# Patient Record
Sex: MALE | Race: WHITE | NOT HISPANIC OR LATINO | Employment: FULL TIME | URBAN - METROPOLITAN AREA
[De-identification: names, ages, dates, MRNs, and addresses within clinical notes are randomized per-mention and may not be internally consistent; named-entity substitution may affect disease eponyms.]

---

## 2024-01-09 VITALS
HEIGHT: 70 IN | BODY MASS INDEX: 36.39 KG/M2 | WEIGHT: 254.19 LBS | TEMPERATURE: 98.6 F | SYSTOLIC BLOOD PRESSURE: 129 MMHG | DIASTOLIC BLOOD PRESSURE: 90 MMHG | OXYGEN SATURATION: 98 % | HEART RATE: 74 BPM | RESPIRATION RATE: 16 BRPM

## 2024-01-09 PROCEDURE — 99283 EMERGENCY DEPT VISIT LOW MDM: CPT

## 2024-01-10 ENCOUNTER — HOSPITAL ENCOUNTER (EMERGENCY)
Facility: HOSPITAL | Age: 49
Discharge: HOME | End: 2024-01-10
Attending: EMERGENCY MEDICINE | Admitting: EMERGENCY MEDICINE
Payer: MEDICARE

## 2024-01-10 DIAGNOSIS — T14.8XXA ANIMAL BITE: Primary | ICD-10-CM

## 2024-01-10 RX ORDER — AMOXICILLIN AND CLAVULANATE POTASSIUM 875; 125 MG/1; MG/1
1 TABLET, FILM COATED ORAL 2 TIMES DAILY
Qty: 14 TABLET | Refills: 0 | Status: SHIPPED | OUTPATIENT
Start: 2024-01-10 | End: 2024-01-17

## 2024-01-10 NOTE — Clinical Note
[facility] EMERGENCY ROOM  [address]  [place] [address]  Phone: [phone]    Jairo Hawkins was seen and treated in our emergency department on 1/9/2024.  He may return to work on 01/11/2024.         Thank you for choosing [facility].    Reyes Hartman MD

## 2024-01-10 NOTE — ED PROVIDER NOTES
"Time: 9:44 PM EST  Date of encounter:  1/9/2024  Independent Historian/Clinical History and Information was obtained by:   Patient    History is limited by: N/A    Chief Complaint   Patient presents with    Animal Bite         History of Present Illness:  Patient is a 48 y.o. year old male who presents to the emergency department for evaluation of dog bite to the left forearm just PTA.  Patient is currently staying at a motel when he encountered another guest who had an aggressive Sami Mcnally dog that bit him on the forearm.  Patient does not know if the dog has had its immunizations or not.     Patient Care Team  Primary Care Provider: Provider, No Known    Past Medical History:     Allergies   Allergen Reactions    Rocephin [Ceftriaxone] Unknown - Low Severity     No past medical history on file.  No past surgical history on file.  No family history on file.    Home Medications:  Prior to Admission medications    Not on File        Social History:          Review of Systems:  Review of Systems   Constitutional:  Negative for chills and fever.   HENT:  Negative for congestion, rhinorrhea and sore throat.    Eyes:  Negative for pain and visual disturbance.   Respiratory:  Negative for apnea, cough, chest tightness and shortness of breath.    Cardiovascular:  Negative for chest pain and palpitations.   Gastrointestinal:  Negative for abdominal pain, diarrhea, nausea and vomiting.   Genitourinary:  Negative for difficulty urinating and dysuria.   Musculoskeletal:  Negative for joint swelling and myalgias.   Skin:  Positive for wound. Negative for color change.   Neurological:  Negative for seizures and headaches.   Psychiatric/Behavioral: Negative.     All other systems reviewed and are negative.       Physical Exam:  /90 (BP Location: Left arm, Patient Position: Sitting)   Pulse 74   Temp 98.6 °F (37 °C) (Oral)   Resp 16   Ht 177.8 cm (70\")   Wt 115 kg (254 lb 3.1 oz)   SpO2 98%   BMI 36.47 kg/m² "         Physical Exam  Vitals and nursing note reviewed.   Constitutional:       General: He is not in acute distress.     Appearance: Normal appearance. He is not toxic-appearing.   HENT:      Head: Normocephalic and atraumatic.      Jaw: There is normal jaw occlusion.   Eyes:      General: Lids are normal.      Extraocular Movements: Extraocular movements intact.      Conjunctiva/sclera: Conjunctivae normal.      Pupils: Pupils are equal, round, and reactive to light.   Cardiovascular:      Rate and Rhythm: Normal rate and regular rhythm.      Pulses: Normal pulses.      Heart sounds: Normal heart sounds.   Pulmonary:      Effort: Pulmonary effort is normal. No respiratory distress.      Breath sounds: Normal breath sounds. No wheezing or rhonchi.   Abdominal:      General: Abdomen is flat. There is no distension.      Palpations: Abdomen is soft.      Tenderness: There is no abdominal tenderness. There is no guarding or rebound.   Musculoskeletal:         General: Normal range of motion.      Cervical back: Normal range of motion and neck supple.      Right lower leg: No edema.      Left lower leg: No edema.   Skin:     General: Skin is warm and dry.      Coloration: Skin is not cyanotic.      Comments: Small dog bite to the left forearm, not currently bleeding.  No puncture wounds present.   Neurological:      Mental Status: He is alert and oriented to person, place, and time. Mental status is at baseline.   Psychiatric:         Attention and Perception: Attention and perception normal.         Mood and Affect: Mood normal.                      Procedures:  Procedures      Medical Decision Making:      Comorbidities that affect care:    None    External Notes reviewed:          The following orders were placed and all results were independently analyzed by me:  No orders of the defined types were placed in this encounter.      Medications Given in the Emergency Department:  Medications - No data to display      ED Course:    The patient was initially evaluated in the triage area where orders were placed. The patient was later dispositioned by Franklyn Bynum PA-C.      The patient was advised to stay for completion of workup which includes but is not limited to communication of labs and radiological results, reassessment and plan. The patient was advised that leaving prior to disposition by a provider could result in critical findings that are not communicated to the patient.          Labs:    Lab Results (last 24 hours)       ** No results found for the last 24 hours. **             Imaging:    No Radiology Exams Resulted Within Past 24 Hours      Differential Diagnosis and Discussion:      Laceration: Laceration was evaluated for arterial injury, ligamentous damage, and other neurovascular injury.        MDM     Will have the nurse cleaned the wound.  There is no purulent drainage, erythema, or bleeding present at this time.  I will send the patient home on Augmentin.  Instruct patient to return to the ED if he develops any worsening symptoms including erythema, warmth, swelling, drainage, and fever.  Patient states he understands and agrees with plan of care.          Patient Care Considerations:          Consultants/Shared Management Plan:    None    Social Determinants of Health:    Patient is independent, reliable, and has access to care.       Disposition and Care Coordination:    Discharged: I considered escalation of care by admitting this patient to the hospital, however the patient has improved and is suitable and stable for discharge.    I have explained the patient´s condition, diagnoses and treatment plan based on the information available to me at this time. I have answered questions and addressed any concerns. The patient has a good  understanding of the patient´s diagnosis, condition, and treatment plan as can be expected at this point. The vital signs have been stable. The patient´s condition is stable  and appropriate for discharge from the emergency department.      The patient will pursue further outpatient evaluation with the primary care physician or other designated or consulting physician as outlined in the discharge instructions. They are agreeable to this plan of care and follow-up instructions have been explained in detail. The patient has received these instructions in written format and have expressed an understanding of the discharge instructions. The patient is aware that any significant change in condition or worsening of symptoms should prompt an immediate return to this or the closest emergency department or call to 911.  I have explained discharge medications and the need for follow up with the patient/caretakers. This was also printed in the discharge instructions. Patient was discharged with the following medications and follow up:      Medication List        New Prescriptions      amoxicillin-clavulanate 875-125 MG per tablet  Commonly known as: AUGMENTIN  Take 1 tablet by mouth 2 (Two) Times a Day for 7 days.               Where to Get Your Medications        These medications were sent to [facility]/pharmacy #[id number] - [place], [place] - [address] - [phone] [facility] [phone] FX  [address] [place] [address] [place] [address]      Hours: 24-hours Phone: [phone]   amoxicillin-clavulanate 875-125 MG per tablet      Provider, No Known  [facility]  [place] [address]    Call in 2 days  As needed       Final diagnoses:   Animal bite        ED Disposition       ED Disposition   Discharge    Condition   Stable    Comment   --               This medical record created using voice recognition software.             Franklyn Bynum PA-C  01/10/24 0107

## 2024-01-10 NOTE — ED TRIAGE NOTES
Pt comes to the ER tonight for a dog bite. Pt states that it was a dog at the hotel he is staying at. Pt doesn't know if dog has had his shots or not. [place] weinberg dog.

## 2024-01-22 ENCOUNTER — HOSPITAL ENCOUNTER (EMERGENCY)
Facility: HOSPITAL | Age: 49
Discharge: HOME | End: 2024-01-22
Attending: EMERGENCY MEDICINE | Admitting: EMERGENCY MEDICINE
Payer: MEDICARE

## 2024-01-22 ENCOUNTER — APPOINTMENT (OUTPATIENT)
Dept: GENERAL RADIOLOGY | Facility: HOSPITAL | Age: 49
End: 2024-01-22
Payer: MEDICARE

## 2024-01-22 VITALS
HEIGHT: 70 IN | HEART RATE: 86 BPM | SYSTOLIC BLOOD PRESSURE: 132 MMHG | OXYGEN SATURATION: 97 % | RESPIRATION RATE: 19 BRPM | BODY MASS INDEX: 36.23 KG/M2 | DIASTOLIC BLOOD PRESSURE: 79 MMHG | WEIGHT: 253.09 LBS | TEMPERATURE: 98.3 F

## 2024-01-22 DIAGNOSIS — R05.1 ACUTE COUGH: ICD-10-CM

## 2024-01-22 DIAGNOSIS — U07.1 COVID-19: Primary | ICD-10-CM

## 2024-01-22 LAB
ALBUMIN SERPL-MCNC: 4.1 G/DL (ref 3.5–5.2)
ALBUMIN/GLOB SERPL: 1.4 G/DL
ALP SERPL-CCNC: 82 U/L (ref 39–117)
ALT SERPL W P-5'-P-CCNC: 35 U/L (ref 1–41)
ANION GAP SERPL CALCULATED.3IONS-SCNC: 13.2 MMOL/L (ref 5–15)
AST SERPL-CCNC: 48 U/L (ref 1–40)
BASOPHILS # BLD AUTO: 0.03 10*3/MM3 (ref 0–0.2)
BASOPHILS NFR BLD AUTO: 0.6 % (ref 0–1.5)
BILIRUB SERPL-MCNC: 0.4 MG/DL (ref 0–1.2)
BUN SERPL-MCNC: 14 MG/DL (ref 6–20)
BUN/CREAT SERPL: 11.3 (ref 7–25)
CALCIUM SPEC-SCNC: 9.2 MG/DL (ref 8.6–10.5)
CHLORIDE SERPL-SCNC: 103 MMOL/L (ref 98–107)
CO2 SERPL-SCNC: 21.8 MMOL/L (ref 22–29)
CREAT SERPL-MCNC: 1.24 MG/DL (ref 0.76–1.27)
DEPRECATED RDW RBC AUTO: 40.8 FL (ref 37–54)
EGFRCR SERPLBLD CKD-EPI 2021: 72.6 ML/MIN/1.73
EOSINOPHIL # BLD AUTO: 0.11 10*3/MM3 (ref 0–0.4)
EOSINOPHIL NFR BLD AUTO: 2.1 % (ref 0.3–6.2)
ERYTHROCYTE [DISTWIDTH] IN BLOOD BY AUTOMATED COUNT: 13.2 % (ref 12.3–15.4)
FLUAV SUBTYP SPEC NAA+PROBE: NOT DETECTED
FLUBV RNA ISLT QL NAA+PROBE: NOT DETECTED
GLOBULIN UR ELPH-MCNC: 3 GM/DL
GLUCOSE SERPL-MCNC: 105 MG/DL (ref 65–99)
HCT VFR BLD AUTO: 39.9 % (ref 37.5–51)
HGB BLD-MCNC: 13.4 G/DL (ref 13–17.7)
HOLD SPECIMEN: NORMAL
HOLD SPECIMEN: NORMAL
IMM GRANULOCYTES # BLD AUTO: 0.01 10*3/MM3 (ref 0–0.05)
IMM GRANULOCYTES NFR BLD AUTO: 0.2 % (ref 0–0.5)
LYMPHOCYTES # BLD AUTO: 1.86 10*3/MM3 (ref 0.7–3.1)
LYMPHOCYTES NFR BLD AUTO: 35.2 % (ref 19.6–45.3)
MCH RBC QN AUTO: 28.4 PG (ref 26.6–33)
MCHC RBC AUTO-ENTMCNC: 33.6 G/DL (ref 31.5–35.7)
MCV RBC AUTO: 84.5 FL (ref 79–97)
MONOCYTES # BLD AUTO: 0.48 10*3/MM3 (ref 0.1–0.9)
MONOCYTES NFR BLD AUTO: 9.1 % (ref 5–12)
NEUTROPHILS NFR BLD AUTO: 2.79 10*3/MM3 (ref 1.7–7)
NEUTROPHILS NFR BLD AUTO: 52.8 % (ref 42.7–76)
NRBC BLD AUTO-RTO: 0 /100 WBC (ref 0–0.2)
NT-PROBNP SERPL-MCNC: <36 PG/ML (ref 0–450)
PLATELET # BLD AUTO: 240 10*3/MM3 (ref 140–450)
PMV BLD AUTO: 9.5 FL (ref 6–12)
POTASSIUM SERPL-SCNC: 3.2 MMOL/L (ref 3.5–5.2)
PROT SERPL-MCNC: 7.1 G/DL (ref 6–8.5)
RBC # BLD AUTO: 4.72 10*6/MM3 (ref 4.14–5.8)
RSV RNA NPH QL NAA+NON-PROBE: NOT DETECTED
SARS-COV-2 RNA RESP QL NAA+PROBE: DETECTED
SODIUM SERPL-SCNC: 138 MMOL/L (ref 136–145)
TROPONIN T SERPL HS-MCNC: 14 NG/L
WBC NRBC COR # BLD AUTO: 5.28 10*3/MM3 (ref 3.4–10.8)
WHOLE BLOOD HOLD COAG: NORMAL
WHOLE BLOOD HOLD SPECIMEN: NORMAL

## 2024-01-22 PROCEDURE — 36415 COLL VENOUS BLD VENIPUNCTURE: CPT

## 2024-01-22 PROCEDURE — 71045 X-RAY EXAM CHEST 1 VIEW: CPT

## 2024-01-22 PROCEDURE — 87637 SARSCOV2&INF A&B&RSV AMP PRB: CPT

## 2024-01-22 PROCEDURE — 93005 ELECTROCARDIOGRAM TRACING: CPT

## 2024-01-22 PROCEDURE — 85025 COMPLETE CBC W/AUTO DIFF WBC: CPT

## 2024-01-22 PROCEDURE — 93005 ELECTROCARDIOGRAM TRACING: CPT | Performed by: EMERGENCY MEDICINE

## 2024-01-22 PROCEDURE — 80053 COMPREHEN METABOLIC PANEL: CPT

## 2024-01-22 PROCEDURE — 99284 EMERGENCY DEPT VISIT MOD MDM: CPT

## 2024-01-22 PROCEDURE — 83880 ASSAY OF NATRIURETIC PEPTIDE: CPT

## 2024-01-22 PROCEDURE — 84484 ASSAY OF TROPONIN QUANT: CPT

## 2024-01-22 RX ORDER — BUDESONIDE AND FORMOTEROL FUMARATE DIHYDRATE 160; 4.5 UG/1; UG/1
2 AEROSOL RESPIRATORY (INHALATION)
Qty: 6 G | Refills: 0 | Status: SHIPPED | OUTPATIENT
Start: 2024-01-22

## 2024-01-22 RX ORDER — DEXTROMETHORPHAN HYDROBROMIDE AND PROMETHAZINE HYDROCHLORIDE 15; 6.25 MG/5ML; MG/5ML
5 SYRUP ORAL 4 TIMES DAILY PRN
Qty: 118 ML | Refills: 0 | Status: SHIPPED | OUTPATIENT
Start: 2024-01-22

## 2024-01-22 RX ORDER — ALBUTEROL SULFATE 90 UG/1
2 AEROSOL, METERED RESPIRATORY (INHALATION) EVERY 4 HOURS PRN
Qty: 6.7 G | Refills: 0 | Status: SHIPPED | OUTPATIENT
Start: 2024-01-22

## 2024-01-22 RX ORDER — SODIUM CHLORIDE 0.9 % (FLUSH) 0.9 %
10 SYRINGE (ML) INJECTION AS NEEDED
Status: DISCONTINUED | OUTPATIENT
Start: 2024-01-22 | End: 2024-01-23 | Stop reason: HOSPADM

## 2024-01-22 NOTE — Clinical Note
[facility] EMERGENCY ROOM  [address]  [place] [address]  Phone: [phone]    Jairo Hawkins was seen and treated in our emergency department on 1/22/2024.  He may return to work on 01/29/2024.         Thank you for choosing [facility].    Chelly Shepherd APRN

## 2024-01-23 ENCOUNTER — NURSE TRIAGE (OUTPATIENT)
Dept: CALL CENTER | Facility: HOSPITAL | Age: 49
End: 2024-01-23
Payer: MEDICARE

## 2024-01-23 LAB
QT INTERVAL: 356 MS
QTC INTERVAL: 420 MS

## 2024-01-23 NOTE — DISCHARGE INSTRUCTIONS
Rest, drink plenty of fluids.  Take your meds as prescribed.  Continue to take over-the-counter acetaminophen and Motrin as needed for aches pains and fever.  Follow-up with your primary care provider upon returning home to [place] for further evaluation and treatment.  Return to the emergency department immediately for any acutely developing respiratory distress, any airway difficulties, any chest pain or any new or worse concerns.

## 2024-01-23 NOTE — ED PROVIDER NOTES
Time: 7:56 PM EST  Date of encounter:  1/22/2024  Independent Historian/Clinical History and Information was obtained by:   Patient    History is limited by: N/A    Chief Complaint   Patient presents with    Shortness of Breath    Cough         History of Present Illness:  The patient is a 48 y.o. year old male who presents to the emergency department for evaluation of shortness of air, cough, and left sided lung pain.  The patient also reports that he has a cyst on his left lung.  He denies any fever.  Has had 1 episode of vomiting but states that it was triggered by coughing.  He has had no diarrhea. He presents in no acute distress, rise and fall chest is equal and unlabored, breath sounds are clear.  He does report a history of COPD and uses Symbicort daily.  He states he is only had shortness of breath this past week while up moving.  He denies any leg or calf pain.  He reports no hemoptysis.      Patient Care Team  Primary Care Provider: Provider, No Known    Past Medical History:     Allergies   Allergen Reactions    Rocephin [Ceftriaxone] Unknown - Low Severity     No past medical history on file.  No past surgical history on file.  No family history on file.    Home Medications:  Prior to Admission medications    Not on File        Social History:          Review of Systems:  Review of Systems   Constitutional:  Negative for chills and fever.   HENT:  Negative for congestion, ear pain and sore throat.    Eyes:  Negative for pain.   Respiratory:  Positive for cough and shortness of breath. Negative for chest tightness and wheezing.    Cardiovascular:  Positive for chest pain (Left side with deep breath.).   Gastrointestinal:  Positive for vomiting (Posttussive x 1). Negative for abdominal pain, diarrhea and nausea.   Genitourinary:  Negative for flank pain and hematuria.   Musculoskeletal:  Negative for back pain, joint swelling, neck pain and neck stiffness.   Skin:  Negative for pallor and rash.  "  Neurological:  Positive for headaches. Negative for seizures.   All other systems reviewed and are negative.       Physical Exam:  /79   Pulse 86   Temp 98.3 °F (36.8 °C)   Resp 19   Ht 177.8 cm (70\")   Wt 115 kg (253 lb 1.4 oz)   SpO2 97%   BMI 36.31 kg/m²         Physical Exam  Vitals and nursing note reviewed.   Constitutional:       General: He is not in acute distress.     Appearance: Normal appearance. He is well-developed. He is not ill-appearing or toxic-appearing.   HENT:      Head: Normocephalic and atraumatic.      Mouth/Throat:      Mouth: Mucous membranes are moist.      Pharynx: Oropharynx is clear.   Eyes:      General: No scleral icterus.     Pupils: Pupils are equal, round, and reactive to light.   Cardiovascular:      Rate and Rhythm: Normal rate and regular rhythm.      Pulses: Normal pulses.   Pulmonary:      Effort: Pulmonary effort is normal. No respiratory distress.      Breath sounds: Normal breath sounds.   Chest:      Chest wall: No tenderness.   Abdominal:      General: Abdomen is flat.      Palpations: Abdomen is soft.      Tenderness: There is no abdominal tenderness. There is no guarding or rebound.   Musculoskeletal:         General: Normal range of motion.      Cervical back: Normal range of motion and neck supple.      Right lower leg: No tenderness. No edema.      Left lower leg: No tenderness. No edema.   Lymphadenopathy:      Cervical: No cervical adenopathy.   Skin:     General: Skin is warm and dry.      Capillary Refill: Capillary refill takes less than 2 seconds.      Findings: No rash.   Neurological:      General: No focal deficit present.      Mental Status: He is alert and oriented to person, place, and time. Mental status is at baseline.   Psychiatric:         Mood and Affect: Mood normal.         Behavior: Behavior normal.                  Procedures:  Procedures      Medical Decision Making:      Comorbidities that affect care:    COPD    External Notes " reviewed:    None      The following orders were placed and all results were independently analyzed by me:  Orders Placed This Encounter   Procedures    COVID PRE-OP / PRE-PROCEDURE SCREENING ORDER (NO ISOLATION) - Swab, Nasopharynx    COVID-19, FLU A/B, RSV PCR 1 HR TAT - Swab, Nasopharynx    XR Chest 1 View    [place] Draw    Comprehensive Metabolic Panel    BNP    Single High Sensitivity Troponin T    CBC Auto Differential    NPO Diet NPO Type: Strict NPO    Undress & Gown    Continuous Pulse Oximetry    Vital Signs    Oxygen Therapy- Nasal Cannula; Titrate 1-6 LPM Per SpO2; 90 - 95%    ECG 12 Lead ED Triage Standing Order; SOA    Insert Peripheral IV    CBC & Differential    Green Top (Gel)    Lavender Top    Gold Top - SST    Light Blue Top       Medications Given in the Emergency Department:  Medications   sodium chloride 0.9 % flush 10 mL (has no administration in time range)        ED Course:    The patient was initially evaluated in the triage area where orders were placed. The patient was later dispositioned by THEO Herrmann.      The patient was advised to stay for completion of workup which includes but is not limited to communication of labs and radiological results, reassessment and plan. The patient was advised that leaving prior to disposition by a provider could result in critical findings that are not communicated to the patient.     ED Course as of 01/22/24 2353   Mon Jan 22, 2024 1957   --- PROVIDER IN TRIAGE NOTE ---    Patient was seen and evaluated in triage by THEO Sinclair.  Orders were written and the patient is currently awaiting disposition.   [MS]      ED Course User Index  [MS] Miladys Lopez APRN       Labs:    Lab Results (last 24 hours)       Procedure Component Value Units Date/Time    COVID PRE-OP / PRE-PROCEDURE SCREENING ORDER (NO ISOLATION) - Swab, Nasopharynx [[id number]]  (Abnormal) Collected: 01/22/24 1959    Specimen: Swab from Nasopharynx Updated:  01/22/24 2102    Narrative:      The following orders were created for panel order COVID PRE-OP / PRE-PROCEDURE SCREENING ORDER (NO ISOLATION) - Swab, Nasopharynx.  Procedure                               Abnormality         Status                     ---------                               -----------         ------                     COVID-19, FLU A/B, RSV P...[[id number]]  Abnormal            Final result                 Please view results for these tests on the individual orders.    COVID-19, FLU A/B, RSV PCR 1 HR TAT - Swab, Nasopharynx [[id number]]  (Abnormal) Collected: 01/22/24 1959    Specimen: Swab from Nasopharynx Updated: 01/22/24 2102     COVID19 Detected     Influenza A PCR Not Detected     Influenza B PCR Not Detected     RSV, PCR Not Detected    Narrative:      Fact sheet for providers: https://www.fda.gov/media/896545/download    Fact sheet for patients: https://www.fda.gov/media/688655/download    Test performed by PCR.    CBC & Differential [[id number]]  (Normal) Collected: 01/22/24 2001    Specimen: Blood from Arm, Right Updated: 01/22/24 2013    Narrative:      The following orders were created for panel order CBC & Differential.  Procedure                               Abnormality         Status                     ---------                               -----------         ------                     CBC Auto Differential[[id number]]        Normal              Final result                 Please view results for these tests on the individual orders.    Comprehensive Metabolic Panel [[id number]]  (Abnormal) Collected: 01/22/24 2001    Specimen: Blood from Arm, Right Updated: 01/22/24 2057     Glucose 105 mg/dL      BUN 14 mg/dL      Creatinine 1.24 mg/dL      Sodium 138 mmol/L      Potassium 3.2 mmol/L      Chloride 103 mmol/L      CO2 21.8 mmol/L      Calcium 9.2 mg/dL      Total Protein 7.1 g/dL      Albumin 4.1 g/dL      ALT (SGPT) 35 U/L      AST (SGOT) 48 U/L      Alkaline Phosphatase 82 U/L       Total Bilirubin 0.4 mg/dL      Globulin 3.0 gm/dL      A/G Ratio 1.4 g/dL      BUN/Creatinine Ratio 11.3     Anion Gap 13.2 mmol/L      eGFR 72.6 mL/min/1.73     Narrative:      GFR Normal >60  Chronic Kidney Disease <60  Kidney Failure <15      BNP [[id number]]  (Normal) Collected: 01/22/24 2001    Specimen: Blood from Arm, Right Updated: 01/22/24 2053     proBNP <36.0 pg/mL     Narrative:      This assay is used as an aid in the diagnosis of individuals suspected of having heart failure. It can be used as an aid in the diagnosis of acute decompensated heart failure (ADHF) in patients presenting with signs and symptoms of ADHF to the emergency department (ED). In addition, NT-proBNP of <300 pg/mL indicates ADHF is not likely.    Age Range Result Interpretation  NT-proBNP Concentration (pg/mL:      <50             Positive            >450                   Gray                 300-450                    Negative             <300    50-75           Positive            >900                  Gray                300-900                  Negative            <300      >75             Positive            >1800                  Gray                300-1800                  Negative            <300    Single High Sensitivity Troponin T [[id number]]  (Normal) Collected: 01/22/24 2001    Specimen: Blood from Arm, Right Updated: 01/22/24 2057     HS Troponin T 14 ng/L     Narrative:      High Sensitive Troponin T Reference Range:  <14.0 ng/L- Negative Female for AMI  <22.0 ng/L- Negative Male for AMI  >=14 - Abnormal Female indicating possible myocardial injury.  >=22 - Abnormal Male indicating possible myocardial injury.   Clinicians would have to utilize clinical acumen, EKG, Troponin, and serial changes to determine if it is an Acute Myocardial Infarction or myocardial injury due to an underlying chronic condition.         CBC Auto Differential [[id number]]  (Normal) Collected: 01/22/24 2001    Specimen: Blood from Arm,  Right Updated: 01/22/24 2013     WBC 5.28 10*3/mm3      RBC 4.72 10*6/mm3      Hemoglobin 13.4 g/dL      Hematocrit 39.9 %      MCV 84.5 fL      MCH 28.4 pg      MCHC 33.6 g/dL      RDW 13.2 %      RDW-SD 40.8 fl      MPV 9.5 fL      Platelets 240 10*3/mm3      Neutrophil % 52.8 %      Lymphocyte % 35.2 %      Monocyte % 9.1 %      Eosinophil % 2.1 %      Basophil % 0.6 %      Immature Grans % 0.2 %      Neutrophils, Absolute 2.79 10*3/mm3      Lymphocytes, Absolute 1.86 10*3/mm3      Monocytes, Absolute 0.48 10*3/mm3      Eosinophils, Absolute 0.11 10*3/mm3      Basophils, Absolute 0.03 10*3/mm3      Immature Grans, Absolute 0.01 10*3/mm3      nRBC 0.0 /100 WBC              Imaging:    XR Chest 1 View    Result Date: 1/22/2024  PROCEDURE: XR CHEST 1 VW  COMPARISON: None  INDICATIONS: SOA Triage Protocol  short of breath cough  FINDINGS:  Lungs normally expanded.  Heart size normal.  No pneumothorax or pleural effusion or focal pulmonary parenchymal opacity.  Pulmonary vessels are distinct.  Bones and soft tissues are normal.       No acute cardiopulmonary abnormality.       HEATH RAM MD       Electronically Signed and Approved By: HEATH RAM MD on 1/22/2024 at 20:52                Differential Diagnosis and Discussion:      Cough: Differential diagnosis includes but is not limited to pneumonia, acute bronchitis, upper respiratory infection, ACE inhibitor use, allergic reaction, epiglottitis, seasonal allergies, chemical irritants, exercise-induced asthma, viral syndrome.  Dyspnea: Differential diagnosis includes but is not limited to metabolic acidosis, neurological disorders, psychogenic, asthma, pneumothorax, upper airway obstruction, COPD, pneumonia, noncardiogenic pulmonary edema, interstitial lung disease, anemia, congestive heart failure, and pulmonary embolism    All labs were reviewed and interpreted by me.  All X-rays impressions were independently interpreted by me.  EKG was interpreted by supervising  attending.    MDM  Number of Diagnoses or Management Options  Acute cough: new and requires workup  COVID-19: new and requires workup  Risk of Complications, Morbidity, and/or Mortality  Presenting problems: low  Diagnostic procedures: low  Management options: low    Patient Progress  Patient progress: stable         Patient Care Considerations:    SEPSIS was considered but is NOT present in the emergency department as SIRS criteria is not present.      Consultants/Shared Management Plan:    None    Social Determinants of Health:    Patient is independent, reliable, and has access to care.       Disposition and Care Coordination:    Discharged: The patient is suitable and stable for discharge with no need for consideration of admission.    I have explained the patient´s condition, diagnoses and treatment plan based on the information available to me at this time. I have answered questions and addressed any concerns. The patient has a good  understanding of the patient´s diagnosis, condition, and treatment plan as can be expected at this point. The vital signs have been stable. The patient´s condition is stable and appropriate for discharge from the emergency department.      The patient will pursue further outpatient evaluation with the primary care physician or other designated or consulting physician as outlined in the discharge instructions. They are agreeable to this plan of care and follow-up instructions have been explained in detail. The patient has received these instructions in written format and have expressed an understanding of the discharge instructions. The patient is aware that any significant change in condition or worsening of symptoms should prompt an immediate return to this or the closest emergency department or call to 911.  I have explained discharge medications and the need for follow up with the patient/caretakers. This was also printed in the discharge instructions. Patient was discharged  with the following medications and follow up:      Medication List        New Prescriptions      albuterol sulfate  (90 Base) MCG/ACT inhaler  Commonly known as: PROVENTIL HFA;VENTOLIN HFA;PROAIR HFA  Inhale 2 puffs Every 4 (Four) Hours As Needed for Wheezing or Shortness of Air.     budesonide-formoterol 160-4.5 MCG/ACT inhaler  Commonly known as: SYMBICORT  Inhale 2 puffs 2 (Two) Times a Day.     promethazine-dextromethorphan 6.25-15 MG/5ML syrup  Commonly known as: PROMETHAZINE-DM  Take 5 mL by mouth 4 (Four) Times a Day As Needed for Cough.               Where to Get Your Medications        These medications were sent to [facility]/pharmacy #[id number] - [place], [place] - [address] - [phone] [facility] [phone] FX  [address] [place] [address] [place] [address]      Hours: 24-hours Phone: [phone]   albuterol sulfate  (90 Base) MCG/ACT inhaler  budesonide-formoterol 160-4.5 MCG/ACT inhaler  promethazine-dextromethorphan 6.25-15 MG/5ML syrup      Your family doctor upon returning home to [place]    Call   FOR FOLLOW UP       Final diagnoses:   COVID-19   Acute cough        ED Disposition       ED Disposition   Discharge    Condition   Stable    Comment   --               This medical record created using voice recognition software.             Chelly Shepherd, THEO  01/22/24 0309

## 2024-01-23 NOTE — TELEPHONE ENCOUNTER
He is from [place] and working in the area- He was discharged from [facility] on 01/22/24 with the diagnosis of Covid 19- He was prescribed medication. He has gotten it filled. He is calling inuring about antiviral. He states he was prescribed this in [place] by his PCP.  Explained if he desires an antiviral medication he will need to return to the ER to be re evaluated. He states he understands. Offered urgent care cannot guarantee that the provider will order an antiviral.      Reason for Disposition   Prescription request for new medicine (not a refill)    Additional Information   Negative: [1] Intentional drug overdose AND [2] suicidal thoughts or ideas   Negative: Drug overdose and triager unable to answer question   Negative: Caller requesting a renewal or refill of a medicine patient is currently taking   Negative: Caller requesting information unrelated to medicine   Negative: Caller requesting information about COVID-19 Vaccine   Negative: Caller requesting information about Emergency Contraception   Negative: Caller requesting information about Combined Birth Control Pills   Negative: Caller requesting information about Progestin Birth Control Pills   Negative: Caller requesting information about Post-Op pain or medicines   Negative: Caller requesting a prescription antibiotic (such as Penicillin) for Strep throat and has a positive culture result   Negative: Caller requesting a prescription anti-viral med (such as Tamiflu) and has influenza (flu) symptoms   Negative: Immunization reaction suspected   Negative: Rash while taking a medicine or within 3 days of stopping it   Negative: [1] Asthma and [2] having symptoms of asthma (cough, wheezing, etc.)   Negative: [1] Symptom of illness (e.g., headache, abdominal pain, earache, vomiting) AND [2] more than mild   Negative: Breastfeeding questions about mother's medicines and diet   Negative: MORE THAN A DOUBLE DOSE of a prescription or over-the-counter (OTC)  "drug   Negative: [1] DOUBLE DOSE (an extra dose or lesser amount) of prescription drug AND [2] any symptoms (e.g., dizziness, nausea, pain, sleepiness)   Negative: [1] DOUBLE DOSE (an extra dose or lesser amount) of over-the-counter (OTC) drug AND [2] any symptoms (e.g., dizziness, nausea, pain, sleepiness)   Negative: Took another person's prescription drug   Negative: [1] DOUBLE DOSE (an extra dose or lesser amount) of prescription drug AND [2] NO symptoms  (Exception: A double dose of antibiotics.)   Negative: Diabetes drug error or overdose (e.g., took wrong type of insulin or took extra dose)   Negative: [1] Prescription not at pharmacy AND [2] was prescribed by PCP recently (Exception: Triager has access to EMR and prescription is recorded there. Go to Home Care and confirm for pharmacy.)   Negative: [1] Pharmacy calling with prescription question AND [2] triager unable to answer question   Negative: [1] Caller has URGENT medicine question about med that PCP or specialist prescribed AND [2] triager unable to answer question   Negative: Medicine patch causing local rash or itching   Negative: [1] Caller has medicine question about med NOT prescribed by PCP AND [2] triager unable to answer question (e.g., compatibility with other med, storage)    Answer Assessment - Initial Assessment Questions  1. NAME of MEDICINE: \"What medicine(s) are you calling about?\"      Antiviral   2. QUESTION: \"What is your question?\" (e.g., double dose of medicine, side effect)      Would like antiviral prescribed.   3. PRESCRIBER: \"Who prescribed the medicine?\" Reason: if prescribed by specialist, call should be referred to that group.     No one   4. SYMPTOMS: \"Do you have any symptoms?\" If Yes, ask: \"What symptoms are you having?\"  \"How bad are the symptoms (e.g., mild, moderate, severe)      Covid   5. PREGNANCY:  \"Is there any chance that you are pregnant?\" \"When was your last menstrual period?\"      no    Protocols used: " Medication Question Call-ADULT-AH

## 2024-02-03 ENCOUNTER — APPOINTMENT (OUTPATIENT)
Dept: GENERAL RADIOLOGY | Facility: HOSPITAL | Age: 49
End: 2024-02-03
Payer: COMMERCIAL

## 2024-02-03 ENCOUNTER — HOSPITAL ENCOUNTER (EMERGENCY)
Facility: HOSPITAL | Age: 49
Discharge: HOME | End: 2024-02-03
Attending: EMERGENCY MEDICINE
Payer: COMMERCIAL

## 2024-02-03 VITALS
DIASTOLIC BLOOD PRESSURE: 77 MMHG | BODY MASS INDEX: 34.81 KG/M2 | RESPIRATION RATE: 16 BRPM | OXYGEN SATURATION: 98 % | SYSTOLIC BLOOD PRESSURE: 128 MMHG | HEIGHT: 71 IN | TEMPERATURE: 98.3 F | WEIGHT: 248.68 LBS | HEART RATE: 85 BPM

## 2024-02-03 DIAGNOSIS — J40 BRONCHITIS: Primary | ICD-10-CM

## 2024-02-03 LAB
FLUAV SUBTYP SPEC NAA+PROBE: NOT DETECTED
FLUBV RNA ISLT QL NAA+PROBE: NOT DETECTED
RSV RNA NPH QL NAA+NON-PROBE: NOT DETECTED
SARS-COV-2 RNA RESP QL NAA+PROBE: NOT DETECTED

## 2024-02-03 PROCEDURE — 87637 SARSCOV2&INF A&B&RSV AMP PRB: CPT | Performed by: EMERGENCY MEDICINE

## 2024-02-03 PROCEDURE — 71046 X-RAY EXAM CHEST 2 VIEWS: CPT

## 2024-02-03 PROCEDURE — 99283 EMERGENCY DEPT VISIT LOW MDM: CPT

## 2024-02-03 RX ORDER — AZITHROMYCIN 250 MG/1
TABLET, FILM COATED ORAL
Qty: 6 TABLET | Refills: 0 | Status: SHIPPED | OUTPATIENT
Start: 2024-02-03

## 2024-02-03 RX ORDER — GUAIFENESIN AND DEXTROMETHORPHAN HYDROBROMIDE 600; 30 MG/1; MG/1
1 TABLET, EXTENDED RELEASE ORAL 2 TIMES DAILY PRN
Qty: 15 EACH | Refills: 0 | Status: SHIPPED | OUTPATIENT
Start: 2024-02-03 | End: 2024-02-10

## 2024-02-03 RX ORDER — GUAIFENESIN 600 MG/1
600 TABLET, EXTENDED RELEASE ORAL ONCE
Status: COMPLETED | OUTPATIENT
Start: 2024-02-03 | End: 2024-02-03

## 2024-02-03 RX ADMIN — GUAIFENESIN 600 MG: 600 TABLET ORAL at 12:05

## 2024-02-03 NOTE — DISCHARGE INSTRUCTIONS
Your COVID, flu are negative  Chest x-ray negative for pneumonia  I have sent Mucinex along with an Z-Philippe to the pharmacy  Please take as prescribed  Drink plenty of fluids, rest, take OTC tylenol/ibuprofen as needed for headache/body aches/fever. Take your prescribed medications as directed.

## 2024-02-03 NOTE — Clinical Note
[facility] EMERGENCY ROOM  [address]  [place] [address]  Phone: [phone]    Jairo Hawkins was seen and treated in our emergency department on 2/3/2024.  He may return to work on 02/05/2024.         Thank you for choosing [facility].    Los Johansen PA-C

## 2024-02-03 NOTE — ED PROVIDER NOTES
Time: 11:33 AM EST  Date of encounter:  2/3/2024  Independent Historian/Clinical History and Information was obtained by:   Patient    History is limited by: N/A    Chief Complaint   Patient presents with    Cough     Covid pos 2 weeks ago         History of Present Illness:  Patient is a 48 y.o. year old male who presents to the emergency department for evaluation of cough with green productive sputum.  Patient states he had COVID about 2 to 3 weeks ago and never fully got over.  He states he is also exposed to black mold at [employer] that he works at.  Has been taking cough medicine that was prescribed to him at his last visit.  Patient states that cough so much it makes him gag and vomit.  Denies fevers.    Patient Care Team  Primary Care Provider: Provider, No Known    Past Medical History:     Allergies   Allergen Reactions    Rocephin [Ceftriaxone] Unknown - Low Severity     Past Medical History:   Diagnosis Date    AC (acromioclavicular) joint bone spurs     scott    Anxiety     Chronic sinusitis     COVID     Depression     Diabetes mellitus     Emphysema of lung     Hyperlipidemia     Injury of back     Insomnia     Otitis media     Tennis elbow syndrome     scott    Trigger finger of left hand     Trigger finger of right hand      Past Surgical History:   Procedure Laterality Date    APPENDECTOMY      CHOLECYSTECTOMY      ENDOSCOPIC FUNCTIONAL SINUS SURGERY (FESS)      LUMBAR FUSION      SHOULDER ACROMIONECTOMY Bilateral     TENNIS ELBOW RELEASE Bilateral     TRIGGER FINGER RELEASE Bilateral     TYMPANOSTOMY TUBE PLACEMENT Bilateral      History reviewed. No pertinent family history.    Home Medications:  Prior to Admission medications    Medication Sig Start Date End Date Taking? Authorizing Provider   albuterol sulfate  (90 Base) MCG/ACT inhaler Inhale 2 puffs Every 4 (Four) Hours As Needed for Wheezing or Shortness of Air. 1/22/24   Chelly Shepherd APRN   budesonide-formoterol (SYMBICORT) 160-4.5  "MCG/ACT inhaler Inhale 2 puffs 2 (Two) Times a Day. 1/22/24   Chelly Shepherd APRN   promethazine-dextromethorphan (PROMETHAZINE-DM) 6.25-15 MG/5ML syrup Take 5 mL by mouth 4 (Four) Times a Day As Needed for Cough. 1/22/24   Chelly Shepherd APRN        Social History:   Social History     Tobacco Use    Smoking status: Never    Smokeless tobacco: Never   Substance Use Topics    Alcohol use: Not Currently    Drug use: Never         Review of Systems:  Review of Systems   Constitutional: Negative.  Negative for fever.   HENT: Negative.     Eyes: Negative.    Respiratory:  Positive for cough.    Cardiovascular: Negative.    Gastrointestinal: Negative.    Endocrine: Negative.    Genitourinary: Negative.    Musculoskeletal: Negative.    Skin: Negative.    Allergic/Immunologic: Negative.    Neurological: Negative.    Hematological: Negative.    Psychiatric/Behavioral: Negative.          Physical Exam:  /77 (BP Location: Left arm, Patient Position: Sitting)   Pulse 85   Temp 98.3 °F (36.8 °C) (Oral)   Resp 16   Ht 179.1 cm (70.5\")   Wt 113 kg (248 lb 10.9 oz)   SpO2 98%   BMI 35.18 kg/m²         Physical Exam  Vitals and nursing note reviewed.   Constitutional:       Appearance: Normal appearance.   HENT:      Head: Normocephalic and atraumatic.      Right Ear: Tympanic membrane normal.      Left Ear: Tympanic membrane normal.      Nose: Nose normal.      Mouth/Throat:      Mouth: Mucous membranes are moist.   Eyes:      Extraocular Movements: Extraocular movements intact.      Conjunctiva/sclera: Conjunctivae normal.      Pupils: Pupils are equal, round, and reactive to light.   Cardiovascular:      Rate and Rhythm: Normal rate and regular rhythm.      Heart sounds: Normal heart sounds.   Pulmonary:      Effort: Pulmonary effort is normal.      Breath sounds: Normal breath sounds.   Musculoskeletal:         General: Normal range of motion.      Cervical back: Normal range of motion and neck supple.   Skin:    "  General: Skin is warm and dry.   Neurological:      General: No focal deficit present.      Mental Status: He is alert and oriented to person, place, and time.   Psychiatric:         Mood and Affect: Mood normal.         Behavior: Behavior normal.               Procedures:  Procedures      Medical Decision Making:      Comorbidities that affect care:    Diabetes    External Notes reviewed:    Previous ED Note: [facility] ED visit from 1/22/2024      The following orders were placed and all results were independently analyzed by me:  Orders Placed This Encounter   Procedures    COVID PRE-OP / PRE-PROCEDURE SCREENING ORDER (NO ISOLATION) - Swab, Nasopharynx    COVID-19, FLU A/B, RSV PCR 1 HR TAT - Swab, Nasopharynx    XR Chest 2 View       Medications Given in the Emergency Department:  Medications   guaiFENesin (MUCINEX) 12 hr tablet 600 mg (600 mg Oral Given 2/3/24 1205)        ED Course:    The patient was initially evaluated in the triage area where orders were placed. The patient was later dispositioned by Los Johansen PA-C.      The patient was advised to stay for completion of workup which includes but is not limited to communication of labs and radiological results, reassessment and plan. The patient was advised that leaving prior to disposition by a provider could result in critical findings that are not communicated to the patient.          Labs:    Lab Results (last 24 hours)       Procedure Component Value Units Date/Time    COVID PRE-OP / PRE-PROCEDURE SCREENING ORDER (NO ISOLATION) - Swab, Nasopharynx [[id number]]  (Normal) Collected: 02/03/24 1134    Specimen: Swab from Nasopharynx Updated: 02/03/24 1217    Narrative:      The following orders were created for panel order COVID PRE-OP / PRE-PROCEDURE SCREENING ORDER (NO ISOLATION) - Swab, Nasopharynx.  Procedure                               Abnormality         Status                     ---------                               -----------         ------                      COVID-19, FLU A/B, RSV P...[[id number]]  Normal              Final result                 Please view results for these tests on the individual orders.    COVID-19, FLU A/B, RSV PCR 1 HR TAT - Swab, Nasopharynx [[id number]]  (Normal) Collected: 02/03/24 1134    Specimen: Swab from Nasopharynx Updated: 02/03/24 1217     COVID19 Not Detected     Influenza A PCR Not Detected     Influenza B PCR Not Detected     RSV, PCR Not Detected    Narrative:      Fact sheet for providers: https://www.fda.gov/media/112730/download    Fact sheet for patients: https://www.fda.gov/media/222705/download    Test performed by PCR.             Imaging:    XR Chest 2 View    Result Date: 2/3/2024  PROCEDURE: XR CHEST 2 VW  COMPARISON: [facility], CR, XR CHEST 1 VW, 1/22/2024, 20:31.  INDICATIONS: cough, dx covid 2 weeks ago, black mold at work  FINDINGS:  Lungs normally expanded.  Heart size normal.  No pneumothorax or pleural effusion.  No focal pulmonary parenchymal opacities.  Calcified lymph nodes in the gray.  Bones and soft tissues are normal.       No acute cardiopulmonary abnormality.  No change from previous chest radiograph.     HEATH RAM MD       Electronically Signed and Approved By: HEATH RAM MD on 2/03/2024 at 12:03                Differential Diagnosis and Discussion:      Cough: Differential diagnosis includes but is not limited to pneumonia, acute bronchitis, upper respiratory infection, ACE inhibitor use, allergic reaction, epiglottitis, seasonal allergies, chemical irritants, exercise-induced asthma, viral syndrome.    All labs were reviewed and interpreted by me.  All X-rays impressions were independently interpreted by me.    MDM     Amount and/or Complexity of Data Reviewed  Clinical lab tests: reviewed  Tests in the radiology section of CPT®: reviewed                 Patient Care Considerations:    ANTIBIOTICS: I considered prescribing antibiotics as an outpatient however no  bacterial focus of infection was found.      Consultants/Shared Management Plan:    None    Social Determinants of Health:    Patient is independent, reliable, and has access to care.       Disposition and Care Coordination:    Discharged: The patient is suitable and stable for discharge with no need for consideration of admission.    I have explained the patient´s condition, diagnoses and treatment plan based on the information available to me at this time. I have answered questions and addressed any concerns. The patient has a good  understanding of the patient´s diagnosis, condition, and treatment plan as can be expected at this point. The vital signs have been stable. The patient´s condition is stable and appropriate for discharge from the emergency department.      The patient will pursue further outpatient evaluation with the primary care physician or other designated or consulting physician as outlined in the discharge instructions. They are agreeable to this plan of care and follow-up instructions have been explained in detail. The patient has received these instructions in written format and have expressed an understanding of the discharge instructions. The patient is aware that any significant change in condition or worsening of symptoms should prompt an immediate return to this or the closest emergency department or call to 911.  I have explained discharge medications and the need for follow up with the patient/caretakers. This was also printed in the discharge instructions. Patient was discharged with the following medications and follow up:      Medication List        New Prescriptions      azithromycin 250 MG tablet  Commonly known as: Zithromax Z-Philippe  Take 2 tablets by mouth on day 1, then 1 tablet daily on days 2-5     guaifenesin-dextromethorphan  MG tablet sustained-release 12 hour tablet  Take 1 tablet by mouth 2 (Two) Times a Day As Needed (cough) for up to 7 days.               Where to  Get Your Medications        These medications were sent to [facility]/pharmacy #[id number] - [place], [place] - [address] - [phone]  - [phone]   [address] [place] [address]      Hours: 24-hours Phone: [phone]   azithromycin 250 MG tablet  guaifenesin-dextromethorphan  MG tablet sustained-release 12 hour tablet      [facility], No Known  [facility] [place] [address]             Final diagnoses:   Bronchitis        ED Disposition       ED Disposition   Discharge    Condition   Stable    Comment   --               This medical record created using voice recognition software.             Los Johansen PA-C  02/03/24 [id number]

## 2024-02-06 ENCOUNTER — HOSPITAL ENCOUNTER (EMERGENCY)
Facility: HOSPITAL | Age: 49
Discharge: HOME | End: 2024-02-06
Attending: EMERGENCY MEDICINE | Admitting: EMERGENCY MEDICINE
Payer: COMMERCIAL

## 2024-02-06 ENCOUNTER — APPOINTMENT (OUTPATIENT)
Dept: GENERAL RADIOLOGY | Facility: HOSPITAL | Age: 49
End: 2024-02-06
Payer: COMMERCIAL

## 2024-02-06 VITALS
HEART RATE: 76 BPM | HEIGHT: 70 IN | BODY MASS INDEX: 35.66 KG/M2 | TEMPERATURE: 98.2 F | WEIGHT: 249.12 LBS | OXYGEN SATURATION: 98 % | RESPIRATION RATE: 18 BRPM | DIASTOLIC BLOOD PRESSURE: 83 MMHG | SYSTOLIC BLOOD PRESSURE: 115 MMHG

## 2024-02-06 DIAGNOSIS — R06.02 SHORTNESS OF BREATH: Primary | ICD-10-CM

## 2024-02-06 LAB
ALBUMIN SERPL-MCNC: 4.4 G/DL (ref 3.5–5.2)
ALBUMIN/GLOB SERPL: 1.4 G/DL
ALP SERPL-CCNC: 84 U/L (ref 39–117)
ALT SERPL W P-5'-P-CCNC: 31 U/L (ref 1–41)
ANION GAP SERPL CALCULATED.3IONS-SCNC: 12.2 MMOL/L (ref 5–15)
AST SERPL-CCNC: 33 U/L (ref 1–40)
BASOPHILS # BLD AUTO: 0.04 10*3/MM3 (ref 0–0.2)
BASOPHILS NFR BLD AUTO: 0.8 % (ref 0–1.5)
BILIRUB SERPL-MCNC: 0.5 MG/DL (ref 0–1.2)
BUN SERPL-MCNC: 10 MG/DL (ref 6–20)
BUN/CREAT SERPL: 8.1 (ref 7–25)
CALCIUM SPEC-SCNC: 9.7 MG/DL (ref 8.6–10.5)
CHLORIDE SERPL-SCNC: 101 MMOL/L (ref 98–107)
CO2 SERPL-SCNC: 23.8 MMOL/L (ref 22–29)
CREAT SERPL-MCNC: 1.24 MG/DL (ref 0.76–1.27)
DEPRECATED RDW RBC AUTO: 40.2 FL (ref 37–54)
EGFRCR SERPLBLD CKD-EPI 2021: 72.6 ML/MIN/1.73
EOSINOPHIL # BLD AUTO: 0.14 10*3/MM3 (ref 0–0.4)
EOSINOPHIL NFR BLD AUTO: 2.8 % (ref 0.3–6.2)
ERYTHROCYTE [DISTWIDTH] IN BLOOD BY AUTOMATED COUNT: 13 % (ref 12.3–15.4)
GLOBULIN UR ELPH-MCNC: 3.2 GM/DL
GLUCOSE SERPL-MCNC: 89 MG/DL (ref 65–99)
HCT VFR BLD AUTO: 41.9 % (ref 37.5–51)
HGB BLD-MCNC: 14 G/DL (ref 13–17.7)
HOLD SPECIMEN: NORMAL
HOLD SPECIMEN: NORMAL
IMM GRANULOCYTES # BLD AUTO: 0.01 10*3/MM3 (ref 0–0.05)
IMM GRANULOCYTES NFR BLD AUTO: 0.2 % (ref 0–0.5)
LYMPHOCYTES # BLD AUTO: 1.69 10*3/MM3 (ref 0.7–3.1)
LYMPHOCYTES NFR BLD AUTO: 33.9 % (ref 19.6–45.3)
MCH RBC QN AUTO: 28.6 PG (ref 26.6–33)
MCHC RBC AUTO-ENTMCNC: 33.4 G/DL (ref 31.5–35.7)
MCV RBC AUTO: 85.7 FL (ref 79–97)
MONOCYTES # BLD AUTO: 0.42 10*3/MM3 (ref 0.1–0.9)
MONOCYTES NFR BLD AUTO: 8.4 % (ref 5–12)
NEUTROPHILS NFR BLD AUTO: 2.68 10*3/MM3 (ref 1.7–7)
NEUTROPHILS NFR BLD AUTO: 53.9 % (ref 42.7–76)
NRBC BLD AUTO-RTO: 0 /100 WBC (ref 0–0.2)
NT-PROBNP SERPL-MCNC: <36 PG/ML (ref 0–450)
PLATELET # BLD AUTO: 277 10*3/MM3 (ref 140–450)
PMV BLD AUTO: 9.5 FL (ref 6–12)
POTASSIUM SERPL-SCNC: 4.2 MMOL/L (ref 3.5–5.2)
PROT SERPL-MCNC: 7.6 G/DL (ref 6–8.5)
QT INTERVAL: 376 MS
QTC INTERVAL: 434 MS
RBC # BLD AUTO: 4.89 10*6/MM3 (ref 4.14–5.8)
SODIUM SERPL-SCNC: 137 MMOL/L (ref 136–145)
TROPONIN T SERPL HS-MCNC: 14 NG/L
WBC NRBC COR # BLD AUTO: 4.98 10*3/MM3 (ref 3.4–10.8)
WHOLE BLOOD HOLD COAG: NORMAL
WHOLE BLOOD HOLD SPECIMEN: NORMAL

## 2024-02-06 PROCEDURE — 93005 ELECTROCARDIOGRAM TRACING: CPT | Performed by: EMERGENCY MEDICINE

## 2024-02-06 PROCEDURE — 36415 COLL VENOUS BLD VENIPUNCTURE: CPT

## 2024-02-06 PROCEDURE — 85025 COMPLETE CBC W/AUTO DIFF WBC: CPT

## 2024-02-06 PROCEDURE — 93005 ELECTROCARDIOGRAM TRACING: CPT

## 2024-02-06 PROCEDURE — 83880 ASSAY OF NATRIURETIC PEPTIDE: CPT

## 2024-02-06 PROCEDURE — 71045 X-RAY EXAM CHEST 1 VIEW: CPT

## 2024-02-06 PROCEDURE — 93010 ELECTROCARDIOGRAM REPORT: CPT | Performed by: INTERNAL MEDICINE

## 2024-02-06 PROCEDURE — 99284 EMERGENCY DEPT VISIT MOD MDM: CPT

## 2024-02-06 PROCEDURE — 80053 COMPREHEN METABOLIC PANEL: CPT

## 2024-02-06 PROCEDURE — 84484 ASSAY OF TROPONIN QUANT: CPT

## 2024-02-06 RX ORDER — SODIUM CHLORIDE 0.9 % (FLUSH) 0.9 %
10 SYRINGE (ML) INJECTION AS NEEDED
Status: DISCONTINUED | OUTPATIENT
Start: 2024-02-06 | End: 2024-02-06 | Stop reason: HOSPADM

## 2024-02-06 NOTE — ED PROVIDER NOTES
Time: 1:52 PM EST  Date of encounter:  2/6/2024  Independent Historian/Clinical History and Information was obtained by:   Patient    History is limited by: N/A    Chief Complaint: Shortness of breath      History of Present Illness:  Patient is a 48 y.o. year old male who presents to the emergency department for evaluation of shortness of breath.  Patient has a history of diabetes, emphysema who presents with complaints of shortness of breath.  States he was at work today when he states he got short of breath and also may have had a panic attack.  States he just felt like he could not get with a deep breath and just felt like he was not getting enough oxygen.  States he admitted to the medical tent and they put an oxygen sensor on him and it was 60.  But when he got in the ambulance it was 100.  Patient states that he feels a little bit better at this time.  Does have a recent history of COVID as well as a recent URI.  No history of blood clots.  Patient not on blood thinners.  No other complaints this time.    HPI    Patient Care Team  Primary Care Provider: Provider, No Known    Past Medical History:     Allergies   Allergen Reactions    Rocephin [Ceftriaxone] Unknown - Low Severity     Past Medical History:   Diagnosis Date    AC (acromioclavicular) joint bone spurs     scott    Anxiety     Chronic sinusitis     COVID     Depression     Diabetes mellitus     Emphysema of lung     Hyperlipidemia     Injury of back     Insomnia     Otitis media     Tennis elbow syndrome     scott    Trigger finger of left hand     Trigger finger of right hand      Past Surgical History:   Procedure Laterality Date    APPENDECTOMY      CHOLECYSTECTOMY      ENDOSCOPIC FUNCTIONAL SINUS SURGERY (FESS)      LUMBAR FUSION      SHOULDER ACROMIONECTOMY Bilateral     TENNIS ELBOW RELEASE Bilateral     TRIGGER FINGER RELEASE Bilateral     TYMPANOSTOMY TUBE PLACEMENT Bilateral      History reviewed. No pertinent family history.    Home  "Medications:  Prior to Admission medications    Medication Sig Start Date End Date Taking? Authorizing Provider   albuterol sulfate  (90 Base) MCG/ACT inhaler Inhale 2 puffs Every 4 (Four) Hours As Needed for Wheezing or Shortness of Air. 1/22/24   Chelly Shepherd APRN   azithromycin (Zithromax Z-Philippe) 250 MG tablet Take 2 tablets by mouth on day 1, then 1 tablet daily on days 2-5 2/3/24   Los Johansen PA-C   budesonide-formoterol (SYMBICORT) 160-4.5 MCG/ACT inhaler Inhale 2 puffs 2 (Two) Times a Day. 1/22/24   Chelly Shepherd APRN   guaifenesin-dextromethorphan (MUCINEX DM)  MG tablet sustained-release 12 hour tablet Take 1 tablet by mouth 2 (Two) Times a Day As Needed (cough) for up to 7 days. 2/3/24 2/10/24  Lso Johansen PA-C   promethazine-dextromethorphan (PROMETHAZINE-DM) 6.25-15 MG/5ML syrup Take 5 mL by mouth 4 (Four) Times a Day As Needed for Cough. 1/22/24   Chelly Shepherd APRN        Social History:   Social History     Tobacco Use    Smoking status: Never    Smokeless tobacco: Never   Substance Use Topics    Alcohol use: Not Currently    Drug use: Never         Review of Systems:  Review of Systems   Respiratory:  Positive for shortness of breath.         Physical Exam:  /83   Pulse 76   Temp 98.2 °F (36.8 °C) (Oral)   Resp 18   Ht 177.8 cm (70\")   Wt 113 kg (249 lb 1.9 oz)   SpO2 98%   BMI 35.74 kg/m²     Physical Exam  Vitals and nursing note reviewed.   Constitutional:       Appearance: Normal appearance.   HENT:      Head: Normocephalic and atraumatic.   Eyes:      General: No scleral icterus.  Cardiovascular:      Rate and Rhythm: Normal rate and regular rhythm.      Heart sounds: Normal heart sounds.   Pulmonary:      Effort: Pulmonary effort is normal.      Breath sounds: Normal breath sounds.   Abdominal:      Palpations: Abdomen is soft.      Tenderness: There is no abdominal tenderness.   Musculoskeletal:         General: Normal range of motion.      " Cervical back: Normal range of motion.   Skin:     Findings: No rash.   Neurological:      General: No focal deficit present.      Mental Status: He is alert.                  Procedures:  Procedures      Medical Decision Making:      Comorbidities that affect care:    COPD, Diabetes, Obesity    External Notes reviewed:    Reviewed ER note from 2/3/2024      The following orders were placed and all results were independently analyzed by me:  Orders Placed This Encounter   Procedures    XR Chest 1 View    [place] Draw    Comprehensive Metabolic Panel    BNP    Single High Sensitivity Troponin T    CBC Auto Differential    NPO Diet NPO Type: Strict NPO    Undress & Gown    Continuous Pulse Oximetry    Vital Signs    Oxygen Therapy- Nasal Cannula; Titrate 1-6 LPM Per SpO2; 90 - 95%    ECG 12 Lead ED Triage Standing Order; SOA    Insert Peripheral IV    CBC & Differential    Green Top (Gel)    Lavender Top    Gold Top - SST    Light Blue Top       Medications Given in the Emergency Department:  Medications   sodium chloride 0.9 % flush 10 mL (has no administration in time range)        ED Course:    ED Course as of 02/06/24 1424   Tue Feb 06, 2024   1346 EKG interpreted by me  Time: 1224  Heart rate 80  Sinus, normal QRS, normal axis, no acute ischemia [MA]      ED Course User Index  [MA] Matt Cordero MD       Labs:    Lab Results (last 24 hours)       Procedure Component Value Units Date/Time    CBC & Differential [[id number]]  (Normal) Collected: 02/06/24 1222    Specimen: Blood Updated: 02/06/24 1231    Narrative:      The following orders were created for panel order CBC & Differential.  Procedure                               Abnormality         Status                     ---------                               -----------         ------                     CBC Auto Differential[[id number]]        Normal              Final result                 Please view results for these tests on the individual orders.     Comprehensive Metabolic Panel [[id number]] Collected: 02/06/24 1222    Specimen: Blood Updated: 02/06/24 1256     Glucose 89 mg/dL      BUN 10 mg/dL      Creatinine 1.24 mg/dL      Sodium 137 mmol/L      Potassium 4.2 mmol/L      Chloride 101 mmol/L      CO2 23.8 mmol/L      Calcium 9.7 mg/dL      Total Protein 7.6 g/dL      Albumin 4.4 g/dL      ALT (SGPT) 31 U/L      AST (SGOT) 33 U/L      Alkaline Phosphatase 84 U/L      Total Bilirubin 0.5 mg/dL      Globulin 3.2 gm/dL      A/G Ratio 1.4 g/dL      BUN/Creatinine Ratio 8.1     Anion Gap 12.2 mmol/L      eGFR 72.6 mL/min/1.73     Narrative:      GFR Normal >60  Chronic Kidney Disease <60  Kidney Failure <15      BNP [[id number]]  (Normal) Collected: 02/06/24 1222    Specimen: Blood Updated: 02/06/24 1251     proBNP <36.0 pg/mL     Narrative:      This assay is used as an aid in the diagnosis of individuals suspected of having heart failure. It can be used as an aid in the diagnosis of acute decompensated heart failure (ADHF) in patients presenting with signs and symptoms of ADHF to the emergency department (ED). In addition, NT-proBNP of <300 pg/mL indicates ADHF is not likely.    Age Range Result Interpretation  NT-proBNP Concentration (pg/mL:      <50             Positive            >450                   Gray                 300-450                    Negative             <300    50-75           Positive            >900                  Gray                300-900                  Negative            <300      >75             Positive            >1800                  Gray                300-1800                  Negative            <300    Single High Sensitivity Troponin T [[id number]]  (Normal) Collected: 02/06/24 1222    Specimen: Blood Updated: 02/06/24 1256     HS Troponin T 14 ng/L     Narrative:      High Sensitive Troponin T Reference Range:  <14.0 ng/L- Negative Female for AMI  <22.0 ng/L- Negative Male for AMI  >=14 - Abnormal Female indicating possible  myocardial injury.  >=22 - Abnormal Male indicating possible myocardial injury.   Clinicians would have to utilize clinical acumen, EKG, Troponin, and serial changes to determine if it is an Acute Myocardial Infarction or myocardial injury due to an underlying chronic condition.         CBC Auto Differential [[id number]]  (Normal) Collected: 02/06/24 1222    Specimen: Blood Updated: 02/06/24 1231     WBC 4.98 10*3/mm3      RBC 4.89 10*6/mm3      Hemoglobin 14.0 g/dL      Hematocrit 41.9 %      MCV 85.7 fL      MCH 28.6 pg      MCHC 33.4 g/dL      RDW 13.0 %      RDW-SD 40.2 fl      MPV 9.5 fL      Platelets 277 10*3/mm3      Neutrophil % 53.9 %      Lymphocyte % 33.9 %      Monocyte % 8.4 %      Eosinophil % 2.8 %      Basophil % 0.8 %      Immature Grans % 0.2 %      Neutrophils, Absolute 2.68 10*3/mm3      Lymphocytes, Absolute 1.69 10*3/mm3      Monocytes, Absolute 0.42 10*3/mm3      Eosinophils, Absolute 0.14 10*3/mm3      Basophils, Absolute 0.04 10*3/mm3      Immature Grans, Absolute 0.01 10*3/mm3      nRBC 0.0 /100 WBC              Imaging:    XR Chest 1 View    Result Date: 2/6/2024  PROCEDURE: XR CHEST 1 VW  COMPARISON: [facility], , XR CHEST 2 VW, 2/03/2024, 11:41.  INDICATIONS: dyspnea x 2 days  FINDINGS:  Heart size is within normal limits.  Increased prominence of perihilar markings noted.  Linear opacities noted at the left base.  No pleural effusion noted.  A focal scarring noted bilaterally.  Multilevel degenerative changes noted of the spine        1. Dependent opacity at the lung base favored to represent atelectasis.       KRYSTEN MONTEZ MD       Electronically Signed and Approved By: KRYSTEN MONTEZ MD on 2/06/2024 at 13:34                Differential Diagnosis and Discussion:    Dyspnea: Differential diagnosis includes but is not limited to metabolic acidosis, neurological disorders, psychogenic, asthma, pneumothorax, upper airway obstruction, COPD, pneumonia, noncardiogenic  pulmonary edema, interstitial lung disease, anemia, congestive heart failure, and pulmonary embolism    All labs were reviewed and interpreted by me.  All X-rays impressions were independently interpreted by me.  EKG was interpreted by me.    MDM     Amount and/or Complexity of Data Reviewed  Clinical lab tests: reviewed  Tests in the radiology section of CPT®: reviewed  Tests in the medicine section of CPT®: reviewed         Patient is a 48-year-old gentleman who presents with complaints of shortness of breath.  States he got very short of breath today and may have had a panic attack.  He reports that when they checked him in medicalize his oxygen was 60% but when they checked it in the ambulance it was 100%.  Likely a error on the reading at the facility as he has never been hypoxic here and has been ranging from 98 to 100% resting in the room.  We did ambulate the patient and the lowest he did drop was to 93% but that was for 1 reading and it was above 96 the rest the time.  He is well-appearing at this time with no respiratory distress and no signs of blood clot on exam.  He is well-appearing otherwise.  At this time we will have the patient follow-up as an outpatient.        Patient Care Considerations:    I considered CT of the chest but patient is low risk for PE.      Consultants/Shared Management Plan:    None    Social Determinants of Health:    Patient is independent, reliable, and has access to care.       Disposition and Care Coordination:    Discharged: The patient is suitable and stable for discharge with no need for consideration of admission.    I have explained the patient´s condition, diagnoses and treatment plan based on the information available to me at this time. I have answered questions and addressed any concerns. The patient has a good  understanding of the patient´s diagnosis, condition, and treatment plan as can be expected at this point. The vital signs have been stable. The patient´s  condition is stable and appropriate for discharge from the emergency department.      The patient will pursue further outpatient evaluation with the primary care physician or other designated or consulting physician as outlined in the discharge instructions. They are agreeable to this plan of care and follow-up instructions have been explained in detail. The patient has received these instructions in written format and have expressed an understanding of the discharge instructions. The patient is aware that any significant change in condition or worsening of symptoms should prompt an immediate return to this or the closest emergency department or call to 911.     Final diagnoses:   Shortness of breath        ED Disposition       ED Disposition   Discharge    Condition   Stable    Comment   --               This medical record created using voice recognition software.             Matt Cordero MD  02/06/24 1795